# Patient Record
Sex: FEMALE | Race: WHITE | Employment: OTHER | URBAN - METROPOLITAN AREA
[De-identification: names, ages, dates, MRNs, and addresses within clinical notes are randomized per-mention and may not be internally consistent; named-entity substitution may affect disease eponyms.]

---

## 2022-08-19 ENCOUNTER — OFFICE VISIT (OUTPATIENT)
Dept: NEUROLOGY | Age: 22
End: 2022-08-19
Payer: COMMERCIAL

## 2022-08-19 VITALS
OXYGEN SATURATION: 99 % | BODY MASS INDEX: 30.73 KG/M2 | HEART RATE: 86 BPM | SYSTOLIC BLOOD PRESSURE: 110 MMHG | HEIGHT: 62 IN | WEIGHT: 167 LBS | DIASTOLIC BLOOD PRESSURE: 60 MMHG

## 2022-08-19 DIAGNOSIS — H53.9 VISUAL CHANGES: ICD-10-CM

## 2022-08-19 DIAGNOSIS — G43.109 CHRONIC MIGRAINE WITH AURA: Primary | ICD-10-CM

## 2022-08-19 DIAGNOSIS — E23.6 PITUITARY CYST (HCC): ICD-10-CM

## 2022-08-19 PROCEDURE — 99205 OFFICE O/P NEW HI 60 MIN: CPT | Performed by: NURSE PRACTITIONER

## 2022-08-19 RX ORDER — FLUOXETINE 10 MG/1
10 CAPSULE ORAL DAILY
COMMUNITY
Start: 2022-08-01

## 2022-08-19 RX ORDER — BUPROPION HYDROCHLORIDE 150 MG/1
1 TABLET ORAL DAILY
COMMUNITY
Start: 2022-08-01

## 2022-08-19 RX ORDER — PROCHLORPERAZINE MALEATE 10 MG
10 TABLET ORAL
COMMUNITY
Start: 2021-10-13

## 2022-08-19 RX ORDER — NORETHINDRONE ACETATE AND ETHINYL ESTRADIOL 1; .02 MG/1; MG/1
1 TABLET ORAL DAILY
COMMUNITY
Start: 2022-03-29

## 2022-08-19 NOTE — PATIENT INSTRUCTIONS
RESULT POLICY      If we have ordered testing for you, know that; \"NO NEWS IS GOOD NEWS! \"     It is our policy that we no longer call patients with results, nor do we  give test results over the phone. We schedule follow up appointments so that your results can be discussed in person. This allows you to address any questions you have regarding the results. If you choose to go to an imaging center outside of [facility], it is your responsibility to bring imaging report and disc to follow up appointment. If something of concern is revealed on your test, we will contact you to discuss the matter and if needed schedule a sooner follow up appointment. Additionally, results may be found by using the My Chart feature and one of our patient service representatives at the  can give you instructions on how to access this feature to utilize our electronic medical record system. Thank you for your understanding. [address] [facility]   Statement to Patients  April 1, 2014      In an effort to ensure the large volume of patient prescription refills is processed in the most efficient and expeditious manner, we are asking our patients to assist us by calling your Pharmacy for all prescription refills, this will include also your  Mail Order Pharmacy. The pharmacy will contact our office electronically to continue the refill process. Please do not wait until the last minute to call your pharmacy. We need at least 48 hours (2days) to fill prescriptions. We also encourage you to call your pharmacy before going to  your prescription to make sure it is ready. With regard to controlled substance prescription refill requests (narcotic refills) that need to be picked up at our office, we ask your cooperation by providing us with at least 72 hours (3days) notice that you will need a refill.     We will not refill narcotic prescription refill requests after 4:00pm on any weekday, Monday through Thursday, or after 2:00pm on Fridays, or on the weekends. We encourage everyone to explore another way of getting your prescription refill request processed using [email or web address], our patient web portal through our electronic medical record system. [email or web address] is an efficient and effective way to communicate your medication request directly to the office and  downloadable as an bryan on your smart phone . [email or web address] also features a review functionality that allows you to view your medication list as well as leave messages for your physician. Are you ready to get connected? If so please review the attatched instructions or speak to any of our staff to get you set up right away! Thank you so much for your cooperation. Should you have any questions please contact our Practice Administrator.

## 2022-08-21 RX ORDER — ONABOTULINUMTOXINA 200 [USP'U]/1
INJECTION, POWDER, LYOPHILIZED, FOR SOLUTION INTRADERMAL; INTRAMUSCULAR
Qty: 200 UNITS | Refills: 3 | Status: SHIPPED | OUTPATIENT
Start: 2022-08-21

## 2022-08-21 NOTE — PROGRESS NOTES
Sacha Valdez is a 25 y.o. female who presents with the following  Chief Complaint   Patient presents with    New Patient    Migraine       HPI    New patient for migraine   She has had migraines for years and years. She has about 20 days a month as migraine   They last 8 hours or more. The rest are daily headache  She has some type of a headache daily. She has been seeing [facility]. Has tried and failed Topamax, Elavil. 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 Weather is a trigger. No family hx known of migraines or headaches   She has a previous MRI brain with a pituitary cyst.   Not been evaluated in years. She has unilateral, sharp pain on the right   She will get nausea, dizziness, hypersensitivity. She has not been able to find anything to really help rescue  Looking back with notes through [facility] she has also failed imitrex, maxalt, relpax. She does have Nurtec right now with her PCP   She has been debilitated by these migraines for years. Allergies   Allergen Reactions    Erythromycin Nausea and Vomiting    Azithromycin Other (comments)       Current Outpatient Medications   Medication Sig    FLUoxetine (PROzac) 10 mg capsule Take 10 mg by mouth daily. buPROPion XL (WELLBUTRIN XL) 150 mg tablet Take 1 Tablet by mouth daily. norethindrone-ethinyl estradiol (MICROGESTIN 1/20) 1-20 mg-mcg tablet Take 1 Tablet by mouth daily. prochlorperazine (COMPAZINE) 10 mg tablet Take 10 mg by mouth every eight (8) hours as needed. No current facility-administered medications for this visit. Social History     Tobacco Use   Smoking Status Not on file   Smokeless Tobacco Not on file       No past medical history on file.     No past surgical history on file.    No family history on file. Social History     Socioeconomic History    Marital status: SINGLE       Review of Systems   Eyes:  Positive for blurred vision, double vision and photophobia. Respiratory:  Negative for shortness of breath and wheezing. Cardiovascular:  Negative for chest pain. Gastrointestinal:  Positive for nausea. Negative for vomiting. Neurological:  Positive for dizziness and headaches. Negative for seizures and loss of consciousness. Psychiatric/Behavioral:  Negative for memory loss. Remainder of comprehensive review is negative. Physical Exam :    Visit Vitals  /60   Pulse 86   Ht 5' 2\" (1.575 m)   Wt 75.8 kg (167 lb)   SpO2 99%   BMI 30.54 kg/m²       General: Well defined, nourished, and groomed individual in no acute distress. Neck: Supple, nontender, no bruits, no pain with resistance to active range of motion. Musculoskeletal: Extremities revealed no edema and had full range of motion of joints. Psych: Good mood and bright affect    NEUROLOGICAL EXAMINATION:    Mental Status: Alert and oriented to person, place, and time    Cranial Nerves:    II, III, IV, VI: Visual acuity grossly intact. Visual fields are normal.    Pupils are equal, round, and reactive to light and accommodation. Extra-ocular movements are full and fluid. Fundoscopic exam was benign, no ptosis or nystagmus. V-XII: Hearing is grossly intact. Facial features are symmetric, with normal sensation and strength. The palate rises symmetrically and the tongue protrudes midline. Sternocleidomastoids 5/5. Motor Examination: Normal tone, bulk, and strength, 5/5 muscle strength throughout. Coordination: Finger to nose was normal. No resting or intention tremor    Gait and Station: Steady while walking. Normal arm swing. No pronator drift. No muscle wasting or fasiculations noted. Reflexes: DTRs 2+ throughout.           No results found for this or any previous visit. Orders Placed This Encounter    MRI BRAIN W WO CONT     Standing Status:   Future     Standing Expiration Date:   9/19/2023     Order Specific Question:   Is Patient Pregnant? Answer:   No     Order Specific Question:   STAT Creatinine as indicated     Answer:   Yes    REFERRAL TO NEUROLOGY     Referral Priority:   Routine     Referral Type:   Consultation     Referral Reason:   Specialty Services Required     Referred to Provider:   Darron Lujan NP     Number of Visits Requested:   1    FLUoxetine (PROzac) 10 mg capsule     Sig: Take 10 mg by mouth daily. buPROPion XL (WELLBUTRIN XL) 150 mg tablet     Sig: Take 1 Tablet by mouth daily. norethindrone-ethinyl estradiol (MICROGESTIN 1/20) 1-20 mg-mcg tablet     Sig: Take 1 Tablet by mouth daily. prochlorperazine (COMPAZINE) 10 mg tablet     Sig: Take 10 mg by mouth every eight (8) hours as needed. 1. Chronic migraine with aura    2. Pituitary cyst ([address]    3. Visual changes      Refer for Botox for chronic migraine prevention   She is having over 20 migraine days a month   She will benefit significantly. Through [facility] and PCP has failed AED, SSRI and is unable to take BP medications. She will use Nurtec for now PRN   MRI brain to rule out tumor, lesion  Has a pituitary abnormality we will re-evaluate also.                  This note will not be viewable in [employer]

## 2022-09-05 ENCOUNTER — TELEPHONE (OUTPATIENT)
Dept: NEUROLOGY | Age: 22
End: 2022-09-05

## 2022-09-06 ENCOUNTER — HOSPITAL ENCOUNTER (OUTPATIENT)
Dept: MRI IMAGING | Age: 22
Discharge: HOME | End: 2022-09-06
Attending: NURSE PRACTITIONER
Payer: COMMERCIAL

## 2022-09-06 VITALS — BODY MASS INDEX: 29.26 KG/M2 | WEIGHT: 160 LBS

## 2022-09-06 DIAGNOSIS — E23.6 PITUITARY CYST (HCC): ICD-10-CM

## 2022-09-06 DIAGNOSIS — H53.9 VISUAL CHANGES: ICD-10-CM

## 2022-09-06 DIAGNOSIS — G43.109 CHRONIC MIGRAINE WITH AURA: ICD-10-CM

## 2022-09-06 PROCEDURE — A9576 INJ PROHANCE MULTIPACK: HCPCS | Performed by: RADIOLOGY

## 2022-09-06 PROCEDURE — 2709999900 HC NON-CHARGEABLE SUPPLY

## 2022-09-06 PROCEDURE — 74011250636 HC RX REV CODE- 250/636: Performed by: RADIOLOGY

## 2022-09-06 PROCEDURE — 70553 MRI BRAIN STEM W/O & W/DYE: CPT

## 2022-09-06 RX ADMIN — GADOTERIDOL 14 ML: 279.3 INJECTION, SOLUTION INTRAVENOUS at 15:47

## 2022-09-07 ENCOUNTER — TELEPHONE (OUTPATIENT)
Dept: NEUROLOGY | Age: 22
End: 2022-09-07

## 2022-09-07 NOTE — TELEPHONE ENCOUNTER
Re: botox    Rcvd fax with script and message from provider. F[facility]ed [facility] to [facility] with ID and insurance card. Completed PA form for [place] and faxed out for review included cptm [id number] too. Awaiting update.

## 2022-09-17 NOTE — TELEPHONE ENCOUNTER
Re: Botox    Rcvd fax with Approval for botox, effective 9/7/22-9/6/23, auth# [id number]    Scanned letter to chart. Sent update to nurse.

## 2022-09-20 ENCOUNTER — TELEPHONE (OUTPATIENT)
Dept: NEUROLOGY | Age: 22
End: 2022-09-20

## 2022-09-22 NOTE — TELEPHONE ENCOUNTER
Scheduled for next Friday, September 30th at 9:20am. [facility] was unable to make it on Monday afternoon.

## 2022-09-30 ENCOUNTER — OFFICE VISIT (OUTPATIENT)
Dept: NEUROLOGY | Age: 22
End: 2022-09-30
Payer: COMMERCIAL

## 2022-09-30 DIAGNOSIS — G43.109 CHRONIC MIGRAINE WITH AURA: Primary | ICD-10-CM

## 2022-09-30 PROCEDURE — 64615 CHEMODENERV MUSC MIGRAINE: CPT | Performed by: NURSE PRACTITIONER

## 2022-09-30 RX ORDER — CELECOXIB 120 MG/4.8ML
1 LIQUID ORAL
Qty: 5 BOTTLE | Refills: 4 | Status: SHIPPED | OUTPATIENT
Start: 2022-09-30

## 2022-09-30 NOTE — PROGRESS NOTES
[facility]  OFFICE PROCEDURE PROGRESS NOTE        Chart reviewed for the following:   I, [de-identified] M ASH Vasquez, have reviewed the History, Physical and updated the Allergic reactions for [address] performed immediately prior to start of procedure:   I, [de-identified] M ASH Vasquez, have performed the following reviews on Jennifer Nurse prior to the start of the procedure:            * Patient was identified by name and date of birth   * Agreement on procedure being performed was verified  * Risks and Benefits explained to the patient  * Procedure site verified and marked as necessary  * Patient was positioned for comfort  * Consent was signed and verified     Time: 1000      Date of procedure: 9/30/2022    Procedure performed by:  Mazin Aguayo NP    Provider assisted by: None    Patient assisted by: None    How tolerated by patient: tolerated the procedure well with no complications    Post Procedural Pain Scale: 2 - Hurts Little Bit    Comments: None    Botox Injection Note       Indication: patient has chronic recurrent migraine, has greater than 15 migraine headaches per month, has failed two or more categories of preventatives    Procedure:   Botox concentration: 200 units in 4 ml of preservative-free normal saline. 31 sites injections, distribution as follow      Units/site  Sites Sides Subtotal    Procerus 5 1 1 5    5 1 2 10   Frontalis 5 2 2 20   Temporalis 5 4 2 40   Occipitalis 5 3 2 30   Upper cervical paraspinalis 5 2 2 20   Trapezius 5 3 2 30         200 units Botox were reconstituted, 155 units injected as above and the remainder was unavoidably wasted.      Patient tolerated procedure well.     ________________________  Gala Rodriguez

## 2022-09-30 NOTE — PROGRESS NOTES
Chief Complaint   Patient presents with    Procedure     Patient is here for botox. Patient states she is having 3 headaches a week and 1 migraine a week .

## 2022-12-05 ENCOUNTER — TELEPHONE (OUTPATIENT)
Dept: NEUROLOGY | Age: 22
End: 2022-12-05

## 2022-12-05 NOTE — TELEPHONE ENCOUNTER
Patient would like to speak with Gianna Resendiz regarding a migraine she has been having over the past week it comes and goes it throbs and then it eases up. Patient has an appointment tomorrow 12-6-22 and she has to drive an hour and a half and it would like to change it to VV if possible?       Please contact

## 2022-12-06 ENCOUNTER — VIRTUAL VISIT (OUTPATIENT)
Dept: NEUROLOGY | Age: 22
End: 2022-12-06
Payer: COMMERCIAL

## 2022-12-06 DIAGNOSIS — G43.109 CHRONIC MIGRAINE WITH AURA: Primary | ICD-10-CM

## 2022-12-06 PROCEDURE — 99214 OFFICE O/P EST MOD 30 MIN: CPT | Performed by: NURSE PRACTITIONER

## 2022-12-06 RX ORDER — ONABOTULINUMTOXINA 200 [USP'U]/1
INJECTION, POWDER, LYOPHILIZED, FOR SOLUTION INTRADERMAL; INTRAMUSCULAR
Qty: 200 UNITS | Refills: 3 | Status: SHIPPED | OUTPATIENT
Start: 2022-12-06

## 2022-12-06 RX ORDER — PREDNISONE 10 MG/1
TABLET ORAL
Qty: 42 TABLET | Refills: 0 | Status: SHIPPED | OUTPATIENT
Start: 2022-12-06

## 2022-12-06 RX ORDER — ATOGEPANT 60 MG/1
1 TABLET ORAL DAILY
Qty: 90 TABLET | Refills: 1 | Status: SHIPPED | OUTPATIENT
Start: 2022-12-06

## 2022-12-06 RX ORDER — RIMEGEPANT SULFATE 75 MG/75MG
1 TABLET, ORALLY DISINTEGRATING ORAL
COMMUNITY
Start: 2022-04-01 | End: 2022-12-06 | Stop reason: ALTCHOICE

## 2022-12-06 NOTE — PROGRESS NOTES
Since botox has not seen little diffence has had about 2-3 migraines  Having daily HA  Ubrelvy seems to work well. Has had a migraines the past week the the [place] doesn't work for this.    Is sick currently

## 2022-12-07 NOTE — PROGRESS NOTES
Nancy Vo is a 25 y.o. female who was seen by synchronous (real-time) audio-video technology on 12/6/2022 for Follow-up (Post botox migraines)    Virtual FU for chronic migraine post Botox # 1     She is doing ok today   She currently thinks she has a virus and this is making things worse   She is to see PCP   Post BOTOX she had some drooping on her eyelid which is gone. She does feel like it helps some but is definitely interested in getting the second 1 if we think it would help  She is having headaches still we can look at getting a prednisone to help with breaking up the cycle and her virus right now  She is still being active as she can  They have not changed in any other way they are just definitely still there      Assessment & Plan:   Diagnoses and all orders for this visit:    1. Chronic migraine with aura    Other orders  -     predniSONE (DELTASONE) 10 mg tablet; 6 po x2 days, 5 po x 2days, 4 po x 2days, 3 po x2days, 2 po x2days, 1 po x 2days  -     atogepant (Qulipta) 60 mg tab; Take 1 Tablet by mouth daily. -     onabotulinumtoxinA (Botox) 200 unit injection; Inject 155 units into 31 FDA approved sites in head, face, neck, every 3 months for chronic migraine. Continue Botox for treatment #2 as with the clinical trials to needs to be done to evaluate for effectiveness  We will avoid doing Botox above the eyelids as this did cause what she states a significant droop  Continue as needed medications    Get in with PCP today to look into virus  Prednisone to help with breaking cycle  [facility]. FU for botox next. Subjective:       Prior to Admission medications    Medication Sig Start Date End Date Taking? Authorizing Provider   predniSONE (DELTASONE) 10 mg tablet 6 po x2 days, 5 po x 2days, 4 po x 2days, 3 po x2days, 2 po x2days, 1 po x 2days 12/6/22  Yes O'Laughlin, Steward Heimlich, NP   atogepant (Qulipta) 60 mg tab Take 1 Tablet by mouth daily.  12/6/22  Yes O'Kwaku, Lonne Reap, NP   onabotulinumtoxinA (Botox) 200 unit injection Inject 155 units into 31 FDA approved sites in head, face, neck, every 3 months for chronic migraine. 12/6/22  Yes Brandon Vasquez NP   [place] Sidonie Fell) 100 mg tablet 1 at HA onset and repeat in 2 hours if needed. Max 2 in 24 hours 9/30/22  Yes Brandon Vasquez NP   buPROPion XL (WELLBUTRIN XL) 150 mg tablet Take 1 Tablet by mouth daily. 8/1/22  Yes Provider, Historical   norethindrone-ethinyl estradiol (MICROGESTIN 1/20) 1-20 mg-mcg tablet Take 1 Tablet by mouth daily. 3/29/22  Yes Provider, Historical   prochlorperazine (COMPAZINE) 10 mg tablet Take 10 mg by mouth every eight (8) hours as needed. 10/13/21  Yes Provider, Historical     There is no problem list on file for this patient. There are no problems to display for this patient. Current Outpatient Medications   Medication Sig Dispense Refill    predniSONE (DELTASONE) 10 mg tablet 6 po x2 days, 5 po x 2days, 4 po x 2days, 3 po x2days, 2 po x2days, 1 po x 2days 42 Tablet 0    atogepant (Qulipta) 60 mg tab Take 1 Tablet by mouth daily. 90 Tablet 1    onabotulinumtoxinA (Botox) 200 unit injection Inject 155 units into 31 FDA approved sites in head, face, neck, every 3 months for chronic migraine. 200 Units 3    ubrogepant (Ubrelvy) 100 mg tablet 1 at HA onset and repeat in 2 hours if needed. Max 2 in 24 hours 16 Tablet 5    buPROPion XL (WELLBUTRIN XL) 150 mg tablet Take 1 Tablet by mouth daily. norethindrone-ethinyl estradiol (MICROGESTIN 1/20) 1-20 mg-mcg tablet Take 1 Tablet by mouth daily. prochlorperazine (COMPAZINE) 10 mg tablet Take 10 mg by mouth every eight (8) hours as needed. Allergies   Allergen Reactions    Erythromycin Nausea and Vomiting    Azithromycin Other (comments)     No past medical history on file. No past surgical history on file. No family history on file.   Social History     Tobacco Use    Smoking status: Not on file    Smokeless tobacco: Not on file   Substance Use Topics    Alcohol use: Not on file       Review of Systems   Eyes:  Positive for blurred vision and photophobia. Negative for double vision. Cardiovascular:  Negative for chest pain, palpitations, leg swelling and PND. Gastrointestinal:  Positive for nausea. Negative for vomiting. Neurological:  Positive for dizziness and headaches. Negative for seizures and loss of consciousness. Objective:   No flowsheet data found.      [INSTRUCTIONS:  \"[x]\" Indicates a positive item  \"[]\" Indicates a negative item  -- DELETE ALL ITEMS NOT EXAMINED]    Constitutional: [x] Appears well-developed and well-nourished [x] No apparent distress      [] Abnormal -     Mental status: [x] Alert and awake  [x] Oriented to person/place/time [x] Able to follow commands    [] Abnormal -     Eyes:   EOM    [x]  Normal    [] Abnormal -   Sclera  [x]  Normal    [] Abnormal -          Discharge [x]  None visible   [] Abnormal -     HENT: [x] Normocephalic, atraumatic  [] Abnormal -   [x] Mouth/Throat: Mucous membranes are moist    External Ears [x] Normal  [] Abnormal -    Neck: [x] No visualized mass [] Abnormal -     Pulmonary/Chest: [x] Respiratory effort normal   [x] No visualized signs of difficulty breathing or respiratory distress        [] Abnormal -      Musculoskeletal:   [x] Normal gait with no signs of ataxia         [x] Normal range of motion of neck        [] Abnormal -     Neurological:        [x] No Facial Asymmetry (Cranial nerve 7 motor function) (limited exam due to video visit)          [x] No gaze palsy        [] Abnormal -          Skin:        [x] No significant exanthematous lesions or discoloration noted on facial skin         [] Abnormal -            Psychiatric:       [x] Normal Affect [] Abnormal -        [x] No Hallucinations    Other pertinent observable physical exam findings:-        We discussed the expected course, resolution and complications of the diagnosis(es) in detail. Medication risks, benefits, costs, interactions, and alternatives were discussed as indicated. I advised her to contact the office if her condition worsens, changes or fails to improve as anticipated. She expressed understanding with the diagnosis(es) and plan. Shemar Marshall, was evaluated through a synchronous (real-time) audio-video encounter. The patient (or guardian if applicable) is aware that this is a billable service, which includes applicable co-pays. This Virtual Visit was conducted with patient's (and/or legal guardian's) consent. The visit was conducted pursuant to the emergency declaration under the [address] [place] [address], [address] [place] [address] authority and the [employer] and [email or web address] General Act. Patient identification was verified, and a caregiver was present when appropriate. The patient was located at: Home: Nancy [facility] [address]  The provider was located at:  [facility]ility [facility] [address] [place] [address] [address] [place], [place] [address]

## 2022-12-19 ENCOUNTER — TELEPHONE (OUTPATIENT)
Dept: NEUROLOGY | Age: 22
End: 2022-12-19

## 2022-12-27 ENCOUNTER — TELEPHONE (OUTPATIENT)
Dept: NEUROLOGY | Age: 22
End: 2022-12-27

## 2022-12-27 NOTE — TELEPHONE ENCOUNTER
Pt calling ot see if there is any availability to reschedule her procedure for botox a week or two due to conflicts with work, please call.

## 2023-01-03 ENCOUNTER — PATIENT MESSAGE (OUTPATIENT)
Dept: NEUROLOGY | Age: 23
End: 2023-01-03

## 2023-01-16 ENCOUNTER — TELEPHONE (OUTPATIENT)
Dept: NEUROLOGY | Age: 23
End: 2023-01-16

## 2023-01-20 ENCOUNTER — OFFICE VISIT (OUTPATIENT)
Dept: NEUROLOGY | Age: 23
End: 2023-01-20
Payer: COMMERCIAL

## 2023-01-20 DIAGNOSIS — G43.109 CHRONIC MIGRAINE WITH AURA: Primary | ICD-10-CM

## 2023-01-20 NOTE — PROGRESS NOTES
[facility]  OFFICE PROCEDURE PROGRESS NOTE        Chart reviewed for the following:   I, [de-identified] M ASH Vasquze, have reviewed the History, Physical and updated the Allergic reactions for [address] performed immediately prior to start of procedure:   I, [de-identified] M ASH Vasquez, have performed the following reviews on Matthew Elena prior to the start of the procedure:            * Patient was identified by name and date of birth   * Agreement on procedure being performed was verified  * Risks and Benefits explained to the patient  * Procedure site verified and marked as necessary  * Patient was positioned for comfort  * Consent was signed and verified     Time: 1159      Date of procedure: 1/20/2023    Procedure performed by:  Arthur Herrera NP    Provider assisted by: None    Patient assisted by: None    How tolerated by patient: tolerated the procedure well with no complications    Post Procedural Pain Scale: 2 - Hurts Little Bit    Comments: None      Botox Injection Note       Indication: patient has chronic recurrent migraine, has 7-10 less migraine days per month with botox injections    Procedure:   Botox concentration: 200 units in 4 ml of preservative-free normal saline. 31 sites injections, distribution as follow      Units/site  Sites Sides Subtotal    Procerus 5 1 1 5    5 1 2 10   Frontalis 5 2 2 20   Temporalis 5 4 2 40   Occipitalis 5 3 2 30   Upper cervical paraspinalis 5 2 2 20   Trapezius 5 3 2 30         200 units Botox were reconstituted, 155 units injected as above and the remainder was unavoidably wasted.      Patient tolerated procedure well.     _____________________________   Ronaldo [place]

## 2023-01-23 DIAGNOSIS — G43.909 MIGRAINE WITHOUT STATUS MIGRAINOSUS, NOT INTRACTABLE, UNSPECIFIED MIGRAINE TYPE: Primary | ICD-10-CM

## 2023-01-23 RX ORDER — BUTALBITAL, ACETAMINOPHEN AND CAFFEINE 50; 325; 40 MG/1; MG/1; MG/1
TABLET ORAL
Qty: 30 TABLET | Refills: 4 | Status: SHIPPED | OUTPATIENT
Start: 2023-01-23

## 2023-01-24 RX ORDER — DICLOFENAC SODIUM 75 MG/1
75 TABLET, DELAYED RELEASE ORAL 2 TIMES DAILY
Qty: 10 TABLET | Refills: 1 | Status: SHIPPED | OUTPATIENT
Start: 2023-01-24

## 2023-01-27 ENCOUNTER — OFFICE VISIT (OUTPATIENT)
Dept: NEUROLOGY | Age: 23
End: 2023-01-27
Payer: COMMERCIAL

## 2023-01-27 DIAGNOSIS — G43.109 CHRONIC MIGRAINE WITH AURA: Primary | ICD-10-CM

## 2023-01-27 PROCEDURE — 96372 THER/PROPH/DIAG INJ SC/IM: CPT | Performed by: NURSE PRACTITIONER

## 2023-01-27 RX ADMIN — KETOROLAC TROMETHAMINE 60 MG: 30 INJECTION, SOLUTION INTRAMUSCULAR; INTRAVENOUS at 16:00

## 2023-01-27 RX ADMIN — DEXAMETHASONE SODIUM PHOSPHATE 8 MG: 4 INJECTION, SOLUTION INTRA-ARTICULAR; INTRALESIONAL; INTRAMUSCULAR; INTRAVENOUS; SOFT TISSUE at 16:00

## 2023-01-30 RX ORDER — DEXAMETHASONE SODIUM PHOSPHATE 4 MG/ML
8 INJECTION, SOLUTION INTRA-ARTICULAR; INTRALESIONAL; INTRAMUSCULAR; INTRAVENOUS; SOFT TISSUE ONCE
Status: COMPLETED | OUTPATIENT
Start: 2023-01-30 | End: 2023-01-27

## 2023-01-30 RX ORDER — KETOROLAC TROMETHAMINE 30 MG/ML
60 INJECTION, SOLUTION INTRAMUSCULAR; INTRAVENOUS ONCE
Status: COMPLETED | OUTPATIENT
Start: 2023-01-30 | End: 2023-01-27

## 2023-03-17 ENCOUNTER — VIRTUAL VISIT (OUTPATIENT)
Dept: NEUROLOGY | Age: 23
End: 2023-03-17

## 2023-03-17 DIAGNOSIS — G43.109 CHRONIC MIGRAINE WITH AURA: Primary | ICD-10-CM

## 2023-03-17 RX ORDER — ATOGEPANT 60 MG/1
1 TABLET ORAL DAILY
Qty: 90 TABLET | Refills: 1 | Status: SHIPPED | OUTPATIENT
Start: 2023-03-17

## 2023-03-17 RX ORDER — PROCHLORPERAZINE MALEATE 10 MG
TABLET ORAL
Qty: 30 TABLET | Refills: 3 | Status: SHIPPED | OUTPATIENT
Start: 2023-03-17

## 2023-03-19 NOTE — PROGRESS NOTES
Michael Cortés is a 21 y.o. female who was seen by synchronous (real-time) audio-video technology on 3/17/2023 for Follow-up    Follow-up for chronic migraine  She was getting Botox  She has had 2 Botox treatments in has seen some improvement here with the second 1  She is down from a chronic migraine with 30 days a month to about 8  She does state that things are going a little bit better with work and stress  She does also notice that she feels like the Botox with this round has significantly changed  She has not had a really bad 1 where she has had to take any kind of rescue  She does want to refill the Compazine  She has Tania Jenkins if she needs it but actually since the second dose of Botox she has not needed it  She is on Vivica Champagne for a preventative also but the Botox is the big positive here  She is seen a significant difference in just overall prevention  She is not noticed any debilitating migraines in which she had had to use anything    With the last Botox injection we did give her some Toradol and Decadron post migraine and for we will do this again    Assessment & Plan:   Diagnoses and all orders for this visit:    1. Chronic migraine with aura    Other orders  -     atogepant (Qulipta) 60 mg tab; Take 1 Tablet by mouth daily. -     prochlorperazine (COMPAZINE) 10 mg tablet; 1 tablet by mouth every 8 hours PRN      Continue Botox for chronic migraine management  We discussed this in full  She is down from 30 a month to about 8  Then on is painful and not as debilitating  She has not really used the Ubrelvy at all which is a good thing  She will continue to use this if she does need it but continue to use Compazine as needed as we will fill this and Qulipta 60 mg for right now episodic migraine  She will not change anything and will follow back up in about a month for her next Botox    Subjective:       Prior to Admission medications    Medication Sig Start Date End Date Taking?  Authorizing Provider atogepant (Qulipta) 60 mg tab Take 1 Tablet by mouth daily. 3/17/23  Yes Heriberto Vasquez NP   prochlorperazine (COMPAZINE) 10 mg tablet 1 tablet by mouth every 8 hours PRN 3/17/23  Yes Heriberto Vasquez NP   onabotulinumtoxinA (Botox) 200 unit injection Inject 155 units into 31 FDA approved sites in head, face, neck, every 3 months for chronic migraine. 12/6/22  Yes Heriberto Vasquez NP   Guadlupe Plan Lawernce Bean) 100 mg tablet 1 at HA onset and repeat in 2 hours if needed. Max 2 in 24 hours 9/30/22  Yes Heriberto Vasquez NP   buPROPion XL (WELLBUTRIN XL) 150 mg tablet Take 1 Tablet by mouth daily. 8/1/22  Yes Provider, Historical   norethindrone-ethinyl estradiol (MICROGESTIN 1/20) 1-20 mg-mcg tablet Take 1 Tablet by mouth daily. 3/29/22  Yes Provider, Historical     There is no problem list on file for this patient. There are no problems to display for this patient. Current Outpatient Medications   Medication Sig Dispense Refill    atogepant (Qulipta) 60 mg tab Take 1 Tablet by mouth daily. 90 Tablet 1    prochlorperazine (COMPAZINE) 10 mg tablet 1 tablet by mouth every 8 hours PRN 30 Tablet 3    onabotulinumtoxinA (Botox) 200 unit injection Inject 155 units into 31 FDA approved sites in head, face, neck, every 3 months for chronic migraine. 200 Units 3    ubrogepant (Ubrelvy) 100 mg tablet 1 at HA onset and repeat in 2 hours if needed. Max 2 in 24 hours 16 Tablet 5    buPROPion XL (WELLBUTRIN XL) 150 mg tablet Take 1 Tablet by mouth daily. norethindrone-ethinyl estradiol (MICROGESTIN 1/20) 1-20 mg-mcg tablet Take 1 Tablet by mouth daily. Allergies   Allergen Reactions    Erythromycin Nausea and Vomiting    Azithromycin Other (comments)     Past Medical History:   Diagnosis Date    Migraine      History reviewed. No pertinent surgical history. History reviewed. No pertinent family history.   Social History     Tobacco Use    Smoking status: Never    Smokeless tobacco: Not on file Substance Use Topics    Alcohol use: Yes     Comment: Very rarely       ROS    Objective:   No flowsheet data found. [INSTRUCTIONS:  \"[x]\" Indicates a positive item  \"[]\" Indicates a negative item  -- DELETE ALL ITEMS NOT EXAMINED]    Constitutional: [x] Appears well-developed and well-nourished [x] No apparent distress      [] Abnormal -     Mental status: [x] Alert and awake  [x] Oriented to person/place/time [x] Able to follow commands    [] Abnormal -     Eyes:   EOM    [x]  Normal    [] Abnormal -   Sclera  [x]  Normal    [] Abnormal -          Discharge [x]  None visible   [] Abnormal -     HENT: [x] Normocephalic, atraumatic  [] Abnormal -   [x] Mouth/Throat: Mucous membranes are moist    External Ears [x] Normal  [] Abnormal -    Neck: [x] No visualized mass [] Abnormal -     Pulmonary/Chest: [x] Respiratory effort normal   [x] No visualized signs of difficulty breathing or respiratory distress        [] Abnormal -      Musculoskeletal:   [x] Normal gait with no signs of ataxia         [x] Normal range of motion of neck        [] Abnormal -     Neurological:        [x] No Facial Asymmetry (Cranial nerve 7 motor function) (limited exam due to video visit)          [x] No gaze palsy        [] Abnormal -          Skin:        [x] No significant exanthematous lesions or discoloration noted on facial skin         [] Abnormal -            Psychiatric:       [x] Normal Affect [] Abnormal -        [x] No Hallucinations    Other pertinent observable physical exam findings:-        We discussed the expected course, resolution and complications of the diagnosis(es) in detail. Medication risks, benefits, costs, interactions, and alternatives were discussed as indicated. I advised her to contact the office if her condition worsens, changes or fails to improve as anticipated. She expressed understanding with the diagnosis(es) and plan.        Kathie Garcia, was evaluated through a synchronous (real-time) audio-video encounter. The patient (or guardian if applicable) is aware that this is a billable service, which includes applicable co-pays. This Virtual Visit was conducted with patient's (and/or legal guardian's) consent. The visit was conducted pursuant to the emergency declaration under the [address] [place] [address] authority and the [employer] and [employer] Act. Patient identification was verified, and a caregiver was present when appropriate. The patient was located at: Home: Nancy Ko [facility] [address]  The provider was located at:  Facility (Appt Department): [address] [place] [address],

## 2023-04-18 ENCOUNTER — TELEPHONE (OUTPATIENT)
Dept: NEUROLOGY | Age: 23
End: 2023-04-18

## 2023-04-21 ENCOUNTER — OFFICE VISIT (OUTPATIENT)
Dept: NEUROLOGY | Age: 23
End: 2023-04-21

## 2023-04-21 DIAGNOSIS — G43.109 CHRONIC MIGRAINE WITH AURA: Primary | ICD-10-CM

## 2023-04-21 RX ORDER — KETOROLAC TROMETHAMINE 30 MG/ML
60 INJECTION, SOLUTION INTRAMUSCULAR; INTRAVENOUS ONCE
Status: COMPLETED | OUTPATIENT
Start: 2023-04-21 | End: 2023-04-21

## 2023-04-21 RX ORDER — DEXAMETHASONE SODIUM PHOSPHATE 4 MG/ML
8 INJECTION, SOLUTION INTRA-ARTICULAR; INTRALESIONAL; INTRAMUSCULAR; INTRAVENOUS; SOFT TISSUE ONCE
Status: COMPLETED | OUTPATIENT
Start: 2023-04-21 | End: 2023-04-21

## 2023-04-21 RX ADMIN — KETOROLAC TROMETHAMINE 60 MG: 30 INJECTION, SOLUTION INTRAMUSCULAR; INTRAVENOUS at 14:51

## 2023-04-21 RX ADMIN — DEXAMETHASONE SODIUM PHOSPHATE 8 MG: 4 INJECTION, SOLUTION INTRA-ARTICULAR; INTRALESIONAL; INTRAMUSCULAR; INTRAVENOUS; SOFT TISSUE at 14:51

## 2023-04-21 NOTE — PROGRESS NOTES
[facility] NEUROLOGY CLINIC  OFFICE PROCEDURE PROGRESS NOTE        Chart reviewed for the following:   I, [de-identified] M ASH Vasquez, have reviewed the History, Physical and updated the Allergic reactions for [address] performed immediately prior to start of procedure:   I, [de-identified] M ASH Vasquez, have performed the following reviews on Nan Givens prior to the start of the procedure:            * Patient was identified by name and date of birth   * Agreement on procedure being performed was verified  * Risks and Benefits explained to the patient  * Procedure site verified and marked as necessary  * Patient was positioned for comfort  * Consent was signed and verified     Time: 1230      Date of procedure: 4/21/2023    Procedure performed by:  Ophelia Kay NP    Provider assisted by: None    Patient assisted by: None    How tolerated by patient: tolerated the procedure well with no complications    Post Procedural Pain Scale: 2 - Hurts Little Bit    Comments: None    [facility] [id number]  EXP: 10/2025  Botox Injection Note       Indication: patient has chronic recurrent migraine, has 7-10 less migraine days per month with botox injections    Procedure:   Botox concentration: 200 units in 4 ml of preservative-free normal saline. 31 sites injections, distribution as follow      Units/site  Sites Sides Subtotal    Procerus 5 1 1 5    5 1 2 10   Frontalis 5 2 2 20   Temporalis 5 4 2 40   Occipitalis 5 3 2 30   Upper cervical paraspinalis 5 2 2 20   Trapezius 5 3 2 30         200 units Botox were reconstituted, 155 units injected as above and the remainder was unavoidably wasted. Patient tolerated procedure well.     _____________________________   Awanda Raleigh OLAUGHLIN        60 mg IM Toradol   8 mg IM Decadron given in office   No side effects.

## 2023-05-18 ENCOUNTER — TELEPHONE (OUTPATIENT)
Age: 23
End: 2023-05-18

## 2023-05-18 NOTE — TELEPHONE ENCOUNTER
Patient called her insurance and the previous PA has been denied because she had not taken Nurtec. She stated has taken Nurtec (Samples per Quan Dross given). She wants you to call her insurance company and let them know she has taken Nurtec and it did not work for her. Please call if need to discuss further.

## 2023-05-18 NOTE — TELEPHONE ENCOUNTER
Re: Jairo Berkowitz call from local [facility] specialty for PA, looks like pt called as well, created case in [facility] key# [id number], submitted and awaiting update, per review Tabitha Moss is non-preferred.

## 2023-05-18 NOTE — TELEPHONE ENCOUNTER
Re: Kevyn Germain    Reviewed PA request, per review pt was given samples of Nurtec per Key# [id number]    I reviewed script in chart and there is no quantity or dosing schedule listed. Per clinical questons pt MUST have a trial of Nurtec before Kevyn Dallas can be approved. \"*Use of samples to initiate therapy does not meet step-edit/preauthorization criteria. *    *Previous therapies will be verified through pharmacy paid claims or submitted chart notes. *\"    Sent update to provider

## 2023-05-24 ENCOUNTER — TELEPHONE (OUTPATIENT)
Age: 23
End: 2023-05-24

## 2023-05-25 ENCOUNTER — TELEPHONE (OUTPATIENT)
Age: 23
End: 2023-05-25

## 2023-05-30 ENCOUNTER — TELEPHONE (OUTPATIENT)
Age: 23
End: 2023-05-30

## 2023-05-30 DIAGNOSIS — G43.109 MIGRAINE WITH AURA, NOT INTRACTABLE, WITHOUT STATUS MIGRAINOSUS: Primary | ICD-10-CM

## 2023-05-30 RX ORDER — RIMEGEPANT SULFATE 75 MG/75MG
TABLET, ORALLY DISINTEGRATING ORAL
Qty: 16 TABLET | Refills: 4 | Status: SHIPPED | OUTPATIENT
Start: 2023-05-30

## 2023-05-30 RX ORDER — ATOGEPANT 60 MG/1
1 TABLET ORAL DAILY
Qty: 90 TABLET | Refills: 1 | Status: SHIPPED | OUTPATIENT
Start: 2023-05-30

## 2023-05-30 NOTE — TELEPHONE ENCOUNTER
Perez Rubi- (Paredes: [id number])- DENIED     Reason for Denial:   The plan covers the prescribed medication for members who have had an unsuccessful 2-month trial of at least two preventive classes of migraine medications: anticonvulsants, beta-blockers, antidepressants, or long-acting CGRP inhitors.      Provided office note from 8/19/22 showing patient has tried and failed Topamax and Elavil via fax # [phone]    Reconsideration has been received and is in review    [facility] # [phone]    Option 3

## 2023-06-01 NOTE — TELEPHONE ENCOUNTER
06/01/2023 *UPDATED*  Abdulkadir Vleazco (Reconsideration) DENIED [phone] option 3    Contacted patient who stated she called insurance herself and was told denied due to no evidence of Nurtec trial. Patient claims she tried nurtec but made headaches worse and stopped using. I called insurance and advise patient shows nurtec rx from 04/22 to 12/22 in file, however denial letter specifically stated it wants a trail and fail of beta-blockers; anticonvulsants; antidepressants; or Emgality/Aimovig which was provided (note dated 08/2022) and a detailed letter. Rep stated a Peer to Peer can be completed at this time to overturn decision.

## 2023-06-02 ENCOUNTER — TELEPHONE (OUTPATIENT)
Age: 23
End: 2023-06-02

## 2023-06-05 NOTE — TELEPHONE ENCOUNTER
Message addressed. Pt came in picked up samples. Peer to Peer done on Friday 6/2. Questions answered.

## 2023-07-05 ENCOUNTER — TELEPHONE (OUTPATIENT)
Age: 23
End: 2023-07-05

## 2023-07-06 ENCOUNTER — TELEPHONE (OUTPATIENT)
Age: 23
End: 2023-07-06

## 2023-07-11 ENCOUNTER — TELEPHONE (OUTPATIENT)
Age: 23
End: 2023-07-11

## 2023-07-11 NOTE — TELEPHONE ENCOUNTER
Patient is calling to see if we have an earlier or a later procedure time for 7/14/23. Stated she had already seen the message that was sent to her. Wanted us to send one more message to see if anything could be done to change it since her partner brings her to the appointments due to her getting a bit sick for the procedure. But stated they live an hour and a half away and partner has an appt at 6. Please contact to discuss.

## 2023-07-14 ENCOUNTER — PROCEDURE VISIT (OUTPATIENT)
Age: 23
End: 2023-07-14

## 2023-07-14 DIAGNOSIS — G43.711 CHRONIC MIGRAINE WITHOUT AURA, INTRACTABLE, WITH STATUS MIGRAINOSUS: Primary | ICD-10-CM

## 2023-07-14 RX ORDER — ONDANSETRON 2 MG/ML
4 INJECTION INTRAMUSCULAR; INTRAVENOUS ONCE
Status: COMPLETED | OUTPATIENT
Start: 2023-07-14 | End: 2023-07-14

## 2023-07-14 RX ORDER — KETOROLAC TROMETHAMINE 30 MG/ML
60 INJECTION, SOLUTION INTRAMUSCULAR; INTRAVENOUS EVERY 6 HOURS PRN
Status: SHIPPED | OUTPATIENT
Start: 2023-07-14 | End: 2023-07-19

## 2023-07-14 RX ORDER — DEXAMETHASONE SODIUM PHOSPHATE 4 MG/ML
4 INJECTION, SOLUTION INTRA-ARTICULAR; INTRALESIONAL; INTRAMUSCULAR; INTRAVENOUS; SOFT TISSUE EVERY 6 HOURS
Status: SHIPPED | OUTPATIENT
Start: 2023-07-14

## 2023-07-14 RX ADMIN — KETOROLAC TROMETHAMINE 60 MG: 30 INJECTION, SOLUTION INTRAMUSCULAR; INTRAVENOUS at 12:48

## 2023-07-14 RX ADMIN — ONDANSETRON 4 MG: 2 INJECTION INTRAMUSCULAR; INTRAVENOUS at 12:47

## 2023-07-14 RX ADMIN — DEXAMETHASONE SODIUM PHOSPHATE 4 MG: 4 INJECTION, SOLUTION INTRA-ARTICULAR; INTRALESIONAL; INTRAMUSCULAR; INTRAVENOUS; SOFT TISSUE at 12:47

## 2023-07-14 NOTE — PROGRESS NOTES
OFFICE PROCEDURE PROGRESS NOTE        Chart reviewed for the following:   Mathew CHRIS APRN - NP, have reviewed the History, Physical and updated the Allergic reactions for [address] [place] [address] Drive performed immediately prior to start of procedure:   Yesi CHRIS APRN - NP, have performed the following reviews on Radha Lowry prior to the start of the procedure:            * Patient was identified by name and date of birth   * Agreement on procedure being performed was verified  * Risks and Benefits explained to the patient  * Procedure site verified and marked as necessary  * Patient was positioned for comfort  * Consent was signed and verified     Time: 1200      Date of procedure: 7/14/2023    Procedure performed by:  LYNDA Saavedra NP    Provider assisted by: None    Patient assisted by: None    How tolerated by patient: tolerated the procedure well with no complications    Post Procedural Pain Scale: 2 - Hurts Little Bit    Comments: None    ZSK:[id number]  EXP:  02/2026          Botox Injection Note       Indication: patient has chronic recurrent migraine, has 7-10 less migraine days per month with botox injections    Procedure:   Botox concentration: 200 units in 4 ml of preservative-free normal saline. 31 sites injections, distribution as follow      Units/site  Sites Sides Subtotal    Procerus 5 1 1 5    5 1 2 10   Frontalis 5 2 2 20   Temporalis 5 4 2 40   Occipitalis 5 3 2 30   Upper cervical paraspinalis 5 2 2 20   Trapezius 5 3 2 30         200 units Botox were reconstituted, 155 units injected as above and the remainder was unavoidably wasted. Patient tolerated procedure well.     _____________________________   Fanny Lomax OLAUGHLIN       60 mg IM toradol   4 mg IM Decadron   4 mg IM Zofran given with no side effects.

## 2023-10-06 ENCOUNTER — PROCEDURE VISIT (OUTPATIENT)
Age: 23
End: 2023-10-06

## 2023-10-06 DIAGNOSIS — G43.711 CHRONIC MIGRAINE WITHOUT AURA, INTRACTABLE, WITH STATUS MIGRAINOSUS: Primary | ICD-10-CM

## 2023-10-06 RX ORDER — DEXAMETHASONE SODIUM PHOSPHATE 4 MG/ML
4 INJECTION, SOLUTION INTRA-ARTICULAR; INTRALESIONAL; INTRAMUSCULAR; INTRAVENOUS; SOFT TISSUE ONCE
Status: COMPLETED | OUTPATIENT
Start: 2023-10-06 | End: 2023-10-06

## 2023-10-06 RX ORDER — DICLOFENAC SODIUM 75 MG/1
75 TABLET, DELAYED RELEASE ORAL 2 TIMES DAILY
Qty: 60 TABLET | Refills: 3 | Status: SHIPPED | OUTPATIENT
Start: 2023-10-06

## 2023-10-06 RX ORDER — KETOROLAC TROMETHAMINE 30 MG/ML
60 INJECTION, SOLUTION INTRAMUSCULAR; INTRAVENOUS ONCE
Status: COMPLETED | OUTPATIENT
Start: 2023-10-06 | End: 2023-10-06

## 2023-10-06 RX ORDER — ONDANSETRON 2 MG/ML
4 INJECTION INTRAMUSCULAR; INTRAVENOUS ONCE
Status: COMPLETED | OUTPATIENT
Start: 2023-10-06 | End: 2023-10-06

## 2023-10-06 RX ORDER — ATOGEPANT 60 MG/1
1 TABLET ORAL DAILY
Qty: 90 TABLET | Refills: 1 | Status: SHIPPED | OUTPATIENT
Start: 2023-10-06

## 2023-10-06 RX ADMIN — DEXAMETHASONE SODIUM PHOSPHATE 4 MG: 4 INJECTION, SOLUTION INTRA-ARTICULAR; INTRALESIONAL; INTRAMUSCULAR; INTRAVENOUS; SOFT TISSUE at 14:50

## 2023-10-06 RX ADMIN — KETOROLAC TROMETHAMINE 60 MG: 30 INJECTION, SOLUTION INTRAMUSCULAR; INTRAVENOUS at 14:50

## 2023-10-06 RX ADMIN — ONDANSETRON 4 MG: 2 INJECTION INTRAMUSCULAR; INTRAVENOUS at 14:51

## 2023-10-06 NOTE — PROGRESS NOTES
OFFICE PROCEDURE PROGRESS NOTE        Chart reviewed for the following:   Mathew CHRIS APRN - NP, have reviewed the History, Physical and updated the Allergic reactions for [address] [place] [address] Drive performed immediately prior to start of procedure:   Karma CHRIS APRN - NP, have performed the following reviews on Mic Ring prior to the start of the procedure:            * Patient was identified by name and date of birth   * Agreement on procedure being performed was verified  * Risks and Benefits explained to the patient  * Procedure site verified and marked as necessary  * Patient was positioned for comfort  * Consent was signed and verified     Time: 1200      Date of procedure: 10/6/2023    Procedure performed by:  LYNDA Alejandro NP    Provider assisted by: None    Patient assisted by: None    How tolerated by patient: tolerated the procedure well with no complications    Post Procedural Pain Scale: 2 - Hurts Little Bit    Comments: None    KWK:[id number]  EXP: 12/2023          Botox Injection Note       Indication: patient has chronic recurrent migraine, has 7-10 less migraine days per month with botox injections    Procedure:   Botox concentration: 200 units in 4 ml of preservative-free normal saline. 31 sites injections, distribution as follow      Units/site  Sites Sides Subtotal    Procerus 5 1 1 5    5 1 2 10   Frontalis 5 2 2 20   Temporalis 5 4 2 40   Occipitalis 5 3 2 30   Upper cervical paraspinalis 5 2 2 20   Trapezius 5 3 2 30         200 units Botox were reconstituted, 155 units injected as above and the remainder was unavoidably wasted. Patient tolerated procedure well.     _____________________________   Libby Rape OLAUGHLIN       60 mg IM Toradol   4 mg IM Decadron   4 mg IM Zofran   Tolerated in office.

## 2023-12-11 ENCOUNTER — TELEPHONE (OUTPATIENT)
Age: 23
End: 2023-12-11

## 2023-12-11 NOTE — TELEPHONE ENCOUNTER
Please call to discuss the reason for unable to ship the Botox due to patient dis enrolled from [facility] eff 11/30/23

## 2023-12-21 ENCOUNTER — TELEPHONE (OUTPATIENT)
Age: 23
End: 2023-12-21

## 2023-12-28 ENCOUNTER — TELEPHONE (OUTPATIENT)
Age: 23
End: 2023-12-28

## 2023-12-28 NOTE — TELEPHONE ENCOUNTER
PSR called and spoke with the patient who stated she will have the same insurance for her upcoming Botox appointment next year.    Patient would like a call back regarding the status of the prior authorization for her QULIPTA.

## 2023-12-29 ENCOUNTER — TELEPHONE (OUTPATIENT)
Age: 23
End: 2023-12-29

## 2023-12-29 NOTE — TELEPHONE ENCOUNTER
Karlee: I did not have patient's new insurance information and could not get the botox delivered since the old insurance had TERMED. She states she spoke with someone and they told her she was \"good to go on the botox\" I do not have an approval or botox for her appt, can you help? Patient would like a phone call from the prior auth team to explain this to her. She states she called in with her \"new insurance last week, and that should be plenty of time to get the PA and have the botox there\" I explained to her that it does take weeks for this process as we have a lot of botox PA's. Let me know.  Maybe B&B. Appt is 1/5/24. Thanks Carlotta

## 2024-01-02 ENCOUNTER — TELEPHONE (OUTPATIENT)
Age: 24
End: 2024-01-02

## 2024-01-02 NOTE — TELEPHONE ENCOUNTER
Patient is asking for a call back regarding her Botox appointment that's scheduled for this Friday 1/5/24 and a PA for Qulipta. She called 2 weeks ago to update her insurance so her Botox could be mailed. Her insurance is now [employer] and she uploaded it to [employer]. She'd like a call back.

## 2024-01-02 NOTE — TELEPHONE ENCOUNTER
Patient is asking for a call back regarding her Botox appointment that's scheduled for this Friday 1/5/24 and a PA for Qulipta. She called 2 weeks ago to update her insurance so her Botox could be mailed. Her insurance is now [employer] and she uploaded it to [employer]. She'd like a call back.      Note

## 2024-01-03 ENCOUNTER — TELEPHONE (OUTPATIENT)
Age: 24
End: 2024-01-03

## 2024-01-03 NOTE — TELEPHONE ENCOUNTER
Botox B & B PA is currently in the works, please follow notes attached to encounter dated 01/03/24

## 2024-01-03 NOTE — TELEPHONE ENCOUNTER
Updated patient on Qulipta PA. APPROVED. Discussed Botox, explained PA submitted today gave 3-5 day processing time. Explained that PA was submitted as BUY AND BILL so this should expedite the process.  Will keep appt on for FRI and come FRI AM will call to either R/S or inform patient that we will keep it and to come in. Patient verbalized under standing.

## 2024-01-03 NOTE — TELEPHONE ENCOUNTER
Drug Acquisition: Buy and Bill  Drug: Botox 200 units, Dx: G43.711  Insurance: [employer]  Submission Type: Availity  Request Tracking ID: [id number]  Reference #: [id number]  [facility]:   Approval Range: 01/03/24 to 01/02/25    CPT: 77935- No PA Required    Scanned approval letter to media

## 2024-01-03 NOTE — TELEPHONE ENCOUNTER
Qulipta PA submitted via [email or web address]   (Key: [id number]) - [id number]   Approved    Approved today  PA Case: [id number], Status: Approved, Coverage Starts on: 1/3/2024 12:00:00 AM, Coverage Ends on: 1/2/2025 12:00:00 AM.  Authorization Expiration Date: 1/1/2025

## 2024-01-05 ENCOUNTER — PROCEDURE VISIT (OUTPATIENT)
Age: 24
End: 2024-01-05
Payer: COMMERCIAL

## 2024-01-05 DIAGNOSIS — G43.711 CHRONIC MIGRAINE WITHOUT AURA, INTRACTABLE, WITH STATUS MIGRAINOSUS: Primary | ICD-10-CM

## 2024-01-05 PROCEDURE — 64615 CHEMODENERV MUSC MIGRAINE: CPT | Performed by: NURSE PRACTITIONER

## 2024-01-08 ENCOUNTER — TELEPHONE (OUTPATIENT)
Age: 24
End: 2024-01-08

## 2024-01-08 DIAGNOSIS — G43.711 CHRONIC MIGRAINE WITHOUT AURA, INTRACTABLE, WITH STATUS MIGRAINOSUS: Primary | ICD-10-CM

## 2024-01-08 RX ORDER — METHOCARBAMOL 750 MG/1
750 TABLET, FILM COATED ORAL 4 TIMES DAILY
Qty: 40 TABLET | Refills: 0 | Status: SHIPPED | OUTPATIENT
Start: 2024-01-08 | End: 2024-01-18

## 2024-01-08 NOTE — TELEPHONE ENCOUNTER
Patient states she is in a lot of pain after her last appointment from botox on Friday. She states she has been feeling miserable since then. Patient would like a call back to determine what her best options are to help with the pain. Thank you!

## 2024-01-09 NOTE — PROGRESS NOTES
OFFICE PROCEDURE PROGRESS NOTE        Chart reviewed for the following:   Alex CHRIS APRN - NP, have reviewed the History, Physical and updated the Allergic reactions for Trish Ramachandran     TIME OUT performed immediately prior to start of procedure:   Alex CHRIS APRN - NP, have performed the following reviews on Trish Ramachandran prior to the start of the procedure:            * Patient was identified by name and date of birth   * Agreement on procedure being performed was verified  * Risks and Benefits explained to the patient  * Procedure site verified and marked as necessary  * Patient was positioned for comfort  * Consent was signed and verified     Time: 1200      Date of procedure: 1/4/2024    Procedure performed by:  LYNDA Araya NP    Provider assisted by: None    Patient assisted by: None    How tolerated by patient: tolerated the procedure well with no complications    Post Procedural Pain Scale: 2 - Hurts Little Bit    Comments: None      Botox Injection Note       Indication: patient has chronic recurrent migraine, has 7-10 less migraine days per month with botox injections    Procedure:   Botox concentration: 200 units in 4 ml of preservative-free normal saline.   31 sites injections, distribution as follow      Units/site  Sites Sides Subtotal    Procerus 5 1 1 5    5 1 2 10   Frontalis 5 2 2 20   Temporalis 5 4 2 40   Occipitalis 5 3 2 30   Upper cervical paraspinalis 5 2 2 20   Trapezius 5 3 2 30         200 units Botox were reconstituted, 155 units injected as above and the remainder was unavoidably wasted.     Patient tolerated procedure well.     _____________________________   ALEX LINDER

## 2024-01-10 RX ORDER — ATOGEPANT 60 MG/1
1 TABLET ORAL DAILY
Qty: 90 TABLET | Refills: 1 | Status: SHIPPED | OUTPATIENT
Start: 2024-01-10

## 2024-04-12 ENCOUNTER — PROCEDURE VISIT (OUTPATIENT)
Age: 24
End: 2024-04-12

## 2024-04-12 DIAGNOSIS — G43.711 CHRONIC MIGRAINE WITHOUT AURA, INTRACTABLE, WITH STATUS MIGRAINOSUS: Primary | ICD-10-CM

## 2024-04-12 RX ORDER — DEXAMETHASONE SODIUM PHOSPHATE 10 MG/ML
10 INJECTION, SOLUTION INTRAMUSCULAR; INTRAVENOUS ONCE
Status: COMPLETED | OUTPATIENT
Start: 2024-04-12 | End: 2024-04-12

## 2024-04-12 RX ORDER — KETOROLAC TROMETHAMINE 30 MG/ML
60 INJECTION, SOLUTION INTRAMUSCULAR; INTRAVENOUS ONCE
Status: COMPLETED | OUTPATIENT
Start: 2024-04-12 | End: 2024-04-12

## 2024-04-12 RX ADMIN — DEXAMETHASONE SODIUM PHOSPHATE 10 MG: 10 INJECTION, SOLUTION INTRAMUSCULAR; INTRAVENOUS at 11:16

## 2024-04-12 RX ADMIN — KETOROLAC TROMETHAMINE 60 MG: 30 INJECTION, SOLUTION INTRAMUSCULAR; INTRAVENOUS at 11:16

## 2024-04-12 NOTE — PROGRESS NOTES
OFFICE PROCEDURE PROGRESS NOTE        Chart reviewed for the following:   Alex CHRIS APRN - NP, have reviewed the History, Physical and updated the Allergic reactions for Trish Ramachandran     TIME OUT performed immediately prior to start of procedure:   Alex CHRIS APRN - NP, have performed the following reviews on Trish Ramachandran prior to the start of the procedure:            * Patient was identified by name and date of birth   * Agreement on procedure being performed was verified  * Risks and Benefits explained to the patient  * Procedure site verified and marked as necessary  * Patient was positioned for comfort  * Consent was signed and verified     Time: 1000      Date of procedure: 4/12/2024    Procedure performed by:  LYNDA Araya NP    Provider assisted by: None    Patient assisted by: None    How tolerated by patient: tolerated the procedure well with no complications    Post Procedural Pain Scale: 2 - Hurts Little Bit    Comments: None          Botox Injection Note       Indication: patient has chronic recurrent migraine, has 7-10 less migraine days per month with botox injections    Procedure:   Botox concentration: 200 units in 4 ml of preservative-free normal saline.   31 sites injections, distribution as follow      Units/site  Sites Sides Subtotal    Procerus 5 1 1 5    5 1 2 10   Frontalis 5 2 2 20   Temporalis 5 4 2 40   Occipitalis 5 3 2 30   Upper cervical paraspinalis 5 2 2 20   Trapezius 5 3 2 30         200 units Botox were reconstituted, 155 units injected as above and the remainder was unavoidably wasted.     Patient tolerated procedure well.     _____________________________   ALEX OLAUGHLIN     60 MG IM TORADOL   10 MG IM Decadron given both without any side effects.

## 2024-07-12 ENCOUNTER — PROCEDURE VISIT (OUTPATIENT)
Age: 24
End: 2024-07-12

## 2024-07-12 DIAGNOSIS — G43.711 CHRONIC MIGRAINE WITHOUT AURA, INTRACTABLE, WITH STATUS MIGRAINOSUS: Primary | ICD-10-CM

## 2024-07-12 RX ORDER — ATOGEPANT 60 MG/1
1 TABLET ORAL DAILY
Qty: 90 TABLET | Refills: 1 | Status: SHIPPED | OUTPATIENT
Start: 2024-07-12

## 2024-07-12 RX ORDER — DEXAMETHASONE SODIUM PHOSPHATE 10 MG/ML
10 INJECTION, SOLUTION INTRAMUSCULAR; INTRAVENOUS ONCE
Status: COMPLETED | OUTPATIENT
Start: 2024-07-12 | End: 2024-07-12

## 2024-07-12 RX ORDER — KETOROLAC TROMETHAMINE 30 MG/ML
60 INJECTION, SOLUTION INTRAMUSCULAR; INTRAVENOUS ONCE
Status: COMPLETED | OUTPATIENT
Start: 2024-07-12 | End: 2024-07-12

## 2024-07-12 RX ADMIN — KETOROLAC TROMETHAMINE 60 MG: 30 INJECTION, SOLUTION INTRAMUSCULAR; INTRAVENOUS at 10:55

## 2024-07-12 RX ADMIN — DEXAMETHASONE SODIUM PHOSPHATE 10 MG: 10 INJECTION, SOLUTION INTRAMUSCULAR; INTRAVENOUS at 10:52

## 2024-07-12 NOTE — PROGRESS NOTES
OFFICE PROCEDURE PROGRESS NOTE        Chart reviewed for the following:   Alex CHRIS APRN - NP, have reviewed the History, Physical and updated the Allergic reactions for Trish Ramachandran     TIME OUT performed immediately prior to start of procedure:   Alex CHRIS APRN - NP, have performed the following reviews on Trish Ramachandran prior to the start of the procedure:            * Patient was identified by name and date of birth   * Agreement on procedure being performed was verified  * Risks and Benefits explained to the patient  * Procedure site verified and marked as necessary  * Patient was positioned for comfort  * Consent was signed and verified     Time: 1000      Date of procedure: 7/12/2024    Procedure performed by:  LYNDA Araya NP    Provider assisted by: None    Patient assisted by: None    How tolerated by patient: tolerated the procedure well with no complications    Post Procedural Pain Scale: 2 - Hurts Little Bit    Comments: None          Botox Injection Note       Indication: patient has chronic recurrent migraine, has 7-10 less migraine days per month with botox injections    Procedure:   Botox concentration: 200 units in 4 ml of preservative-free normal saline.   31 sites injections, distribution as follow      Units/site  Sites Sides Subtotal    Procerus 5 1 1 5    5 1 2 10   Frontalis 5 2 2 20   Temporalis 5 4 2 40   Occipitalis 5 3 2 30   Upper cervical paraspinalis 5 2 2 20   Trapezius 5 3 2 30         200 units Botox were reconstituted, 155 units injected as above and the remainder was unavoidably wasted.     Patient tolerated procedure well.     _____________________________   ALEX OLAUGHLIN         60 mg IM toradol given   10 mg IM decadron given   No reactions

## 2024-09-27 ENCOUNTER — TELEPHONE (OUTPATIENT)
Age: 24
End: 2024-09-27

## 2024-10-11 ENCOUNTER — OFFICE VISIT (OUTPATIENT)
Age: 24
End: 2024-10-11

## 2024-10-11 DIAGNOSIS — G43.711 CHRONIC MIGRAINE WITHOUT AURA, INTRACTABLE, WITH STATUS MIGRAINOSUS: Primary | ICD-10-CM

## 2024-10-11 RX ORDER — DEXAMETHASONE SODIUM PHOSPHATE 10 MG/ML
10 INJECTION, SOLUTION INTRAMUSCULAR; INTRAVENOUS ONCE
Status: COMPLETED | OUTPATIENT
Start: 2024-10-11 | End: 2024-10-11

## 2024-10-11 RX ORDER — KETOROLAC TROMETHAMINE 30 MG/ML
60 INJECTION, SOLUTION INTRAMUSCULAR; INTRAVENOUS ONCE
Status: COMPLETED | OUTPATIENT
Start: 2024-10-11 | End: 2024-10-11

## 2024-10-11 RX ADMIN — KETOROLAC TROMETHAMINE 60 MG: 30 INJECTION, SOLUTION INTRAMUSCULAR; INTRAVENOUS at 10:21

## 2024-10-11 RX ADMIN — DEXAMETHASONE SODIUM PHOSPHATE 10 MG: 10 INJECTION, SOLUTION INTRAMUSCULAR; INTRAVENOUS at 10:21

## 2024-10-11 NOTE — PROGRESS NOTES
OFFICE PROCEDURE PROGRESS NOTE        Chart reviewed for the following:   Alex CHRIS APRN - NP, have reviewed the History, Physical and updated the Allergic reactions for Trish Ramachandran     TIME OUT performed immediately prior to start of procedure:   Alex CHRIS APRN - NP, have performed the following reviews on Trish Ramachandran prior to the start of the procedure:            * Patient was identified by name and date of birth   * Agreement on procedure being performed was verified  * Risks and Benefits explained to the patient  * Procedure site verified and marked as necessary  * Patient was positioned for comfort  * Consent was signed and verified     Time: 0900      Date of procedure: 10/11/2024    Procedure performed by:  LYNDA Araya NP    Provider assisted by: None    Patient assisted by: None    How tolerated by patient: tolerated the procedure well with no complications    Post Procedural Pain Scale: 2 - Hurts Little Bit    Comments: None    60 mg IM Toradol   10 mg IM Decadron given with no side effects.         Botox Injection Note       Indication: patient has chronic recurrent migraine, has 7-10 less migraine days per month with botox injections    Procedure:   Botox concentration: 200 units in 4 ml of preservative-free normal saline.   31 sites injections, distribution as follow      Units/site  Sites Sides Subtotal    Procerus 5 1 1 5    5 1 2 10   Frontalis 5 2 2 20   Temporalis 5 4 2 40   Occipitalis 5 3 2 30   Upper cervical paraspinalis 5 2 2 20   Trapezius 5 3 2 30         200 units Botox were reconstituted, 155 units injected as above and the remainder was unavoidably wasted.     Patient tolerated procedure well.     _____________________________   ALEX LINDER

## 2024-12-16 ENCOUNTER — TELEPHONE (OUTPATIENT)
Age: 24
End: 2024-12-16

## 2024-12-16 NOTE — TELEPHONE ENCOUNTER
Patient is looking to see if she would be able to get an earlier appt time for Botox on Friday.     Please advise.

## 2024-12-20 ENCOUNTER — OFFICE VISIT (OUTPATIENT)
Age: 24
End: 2024-12-20

## 2024-12-20 DIAGNOSIS — G43.711 CHRONIC MIGRAINE WITHOUT AURA, INTRACTABLE, WITH STATUS MIGRAINOSUS: Primary | ICD-10-CM

## 2024-12-20 RX ORDER — DEXAMETHASONE SODIUM PHOSPHATE 10 MG/ML
10 INJECTION, SOLUTION INTRAMUSCULAR; INTRAVENOUS ONCE
Status: COMPLETED | OUTPATIENT
Start: 2024-12-20 | End: 2024-12-20

## 2024-12-20 RX ORDER — KETOROLAC TROMETHAMINE 30 MG/ML
60 INJECTION, SOLUTION INTRAMUSCULAR; INTRAVENOUS ONCE
Status: COMPLETED | OUTPATIENT
Start: 2024-12-20 | End: 2024-12-20

## 2024-12-20 RX ADMIN — DEXAMETHASONE SODIUM PHOSPHATE 10 MG: 10 INJECTION, SOLUTION INTRAMUSCULAR; INTRAVENOUS at 12:09

## 2024-12-20 RX ADMIN — KETOROLAC TROMETHAMINE 60 MG: 30 INJECTION, SOLUTION INTRAMUSCULAR; INTRAVENOUS at 12:09

## 2024-12-20 NOTE — PROGRESS NOTES
OFFICE PROCEDURE PROGRESS NOTE        Chart reviewed for the following:   Alex CHRIS APRN - NP, have reviewed the History, Physical and updated the Allergic reactions for Trish Ramachandran     TIME OUT performed immediately prior to start of procedure:   Alex CHRIS APRN - NP, have performed the following reviews on Trish Ramachandran prior to the start of the procedure:            * Patient was identified by name and date of birth   * Agreement on procedure being performed was verified  * Risks and Benefits explained to the patient  * Procedure site verified and marked as necessary  * Patient was positioned for comfort  * Consent was signed and verified     Time: 0910      Date of procedure: 12/20/2024    Procedure performed by:  LYNDA Araya NP    Provider assisted by: None    Patient assisted by: None    How tolerated by patient: tolerated the procedure well with no complications    Post Procedural Pain Scale: 2 - Hurts Little Bit    Comments: None    60 MG IM TORADOL   10 MG IM DECADRON   GIVEN IN OFFICE WITH NO SIDE EFFECTS.         Botox Injection Note       Indication: patient has chronic recurrent migraine, has 7-10 less migraine days per month with botox injections    Procedure:   Botox concentration: 200 units in 4 ml of preservative-free normal saline.   31 sites injections, distribution as follow      Units/site  Sites Sides Subtotal    Procerus 5 1 1 5    5 1 2 10   Frontalis 5 2 2 20   Temporalis 5 4 2 40   Occipitalis 5 3 2 30   Upper cervical paraspinalis 5 2 2 20   Trapezius 5 3 2 30         200 units Botox were reconstituted, 155 units injected as above and the remainder was unavoidably wasted.     Patient tolerated procedure well.     _____________________________   ALEX LINDER

## 2025-03-13 ENCOUNTER — TELEPHONE (OUTPATIENT)
Age: 25
End: 2025-03-13

## 2025-03-13 NOTE — TELEPHONE ENCOUNTER
*POLICY NO LONGER SUPPORTS B&B, MUST USE PHARMACY GOING FORWARD*\   REF: [facility] [phone] rep: Jr ONTIVEROS      Botox Drug Acquisition: BUY AND BILL  Drug: BOTOX 200 units  Dx: G43.711  Insurance: [employer]  Submission Type: [facility] [phone] rep: Jr ONTIVEROS  HCPCS:   CPT: 73257, no PA required  Reference #: N/A  Approval Range: MUST USE PHARMACY 2025    Botox Drug Acquisition: PHARMACY  Drug: BOTOX 200 units  Dx: G43.711  Insurance: [employer]  Submission Type: [facility]  Key: [id number]   HCPCS:   CPT: 74701, no PA required  Auth #/Case ID: [id number]   Approval Range: 03/13/25 to 03/13/26  SPP: [facility]     *Scanned approval letter to media

## 2025-03-14 ENCOUNTER — OFFICE VISIT (OUTPATIENT)
Age: 25
End: 2025-03-14

## 2025-03-14 DIAGNOSIS — G43.711 CHRONIC MIGRAINE WITHOUT AURA, INTRACTABLE, WITH STATUS MIGRAINOSUS: Primary | ICD-10-CM

## 2025-03-14 RX ORDER — DEXAMETHASONE SODIUM PHOSPHATE 10 MG/ML
10 INJECTION, SOLUTION INTRAMUSCULAR; INTRAVENOUS ONCE
Status: COMPLETED | OUTPATIENT
Start: 2025-03-14 | End: 2025-03-14

## 2025-03-14 RX ORDER — ATOGEPANT 60 MG/1
1 TABLET ORAL DAILY
Qty: 90 TABLET | Refills: 1 | Status: ACTIVE | OUTPATIENT
Start: 2025-03-14

## 2025-03-14 RX ORDER — KETOROLAC TROMETHAMINE 30 MG/ML
60 INJECTION, SOLUTION INTRAMUSCULAR; INTRAVENOUS ONCE
Status: COMPLETED | OUTPATIENT
Start: 2025-03-14 | End: 2025-03-14

## 2025-03-14 RX ADMIN — DEXAMETHASONE SODIUM PHOSPHATE 10 MG: 10 INJECTION, SOLUTION INTRAMUSCULAR; INTRAVENOUS at 13:31

## 2025-03-14 RX ADMIN — KETOROLAC TROMETHAMINE 60 MG: 30 INJECTION, SOLUTION INTRAMUSCULAR; INTRAVENOUS at 13:32

## 2025-03-14 NOTE — PROGRESS NOTES
OFFICE PROCEDURE PROGRESS NOTE        Chart reviewed for the following:   Alex CHRIS APRN - NP, have reviewed the History, Physical and updated the Allergic reactions for Trish Ramachandran     TIME OUT performed immediately prior to start of procedure:   Alex CHRIS APRN - NP, have performed the following reviews on Trish Ramachandran prior to the start of the procedure:            * Patient was identified by name and date of birth   * Agreement on procedure being performed was verified  * Risks and Benefits explained to the patient  * Procedure site verified and marked as necessary  * Patient was positioned for comfort  * Consent was signed and verified     Time: 0900      Date of procedure: 3/14/2025    Procedure performed by:  LYNDA Araya NP    Provider assisted by: None    Patient assisted by: None    How tolerated by patient: tolerated the procedure well with no complications    Post Procedural Pain Scale: 2 - Hurts Little Bit    Comments: None    60 mg IM toradol   10 mg IM Decadron   No side effects     Patient supplied botox   Sample.       Botox Injection Note       Indication: patient has chronic recurrent migraine, has 7-10 less migraine days per month with botox injections    Procedure:   Botox concentration: 200 units in 4 ml of preservative-free normal saline.   31 sites injections, distribution as follow      Units/site  Sites Sides Subtotal    Procerus 5 1 1 5    5 1 2 10   Frontalis 5 2 2 20   Temporalis 5 4 2 40   Occipitalis 5 3 2 30   Upper cervical paraspinalis 5 2 2 20   Trapezius 5 3 2 30         200 units Botox were reconstituted, 155 units injected as above and the remainder was unavoidably wasted.     Patient tolerated procedure well.     _____________________________   ALEX LINDER         Needs a new re-PA for Qulipta.   Will give samples and send in to trigger

## 2025-04-15 ENCOUNTER — TELEMEDICINE (OUTPATIENT)
Age: 25
End: 2025-04-15
Payer: COMMERCIAL

## 2025-04-15 DIAGNOSIS — G43.711 CHRONIC MIGRAINE WITHOUT AURA, INTRACTABLE, WITH STATUS MIGRAINOSUS: Primary | ICD-10-CM

## 2025-04-15 DIAGNOSIS — E23.7 PITUITARY LESION: ICD-10-CM

## 2025-04-15 PROCEDURE — 99214 OFFICE O/P EST MOD 30 MIN: CPT | Performed by: NURSE PRACTITIONER

## 2025-04-15 NOTE — PROGRESS NOTES
4/15/2025    TELEHEALTH EVALUATION -- Audio/Visual    HPI:    Trish Ramachandran (:  2000) has requested an audio/video evaluation for the following concern(s):      FU chronic migraine   Has been getting Botox for chronic migraine   Has been dropped from 25 a month to around 5-6 a month   She takes the Ubrelvy if needed.     They have not changed at all.   Unilateral, sharp  Hypersensitive to light, sound, smell.     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 Authorizing Provider   Atogepant (QULIPTA) 60 MG TABS Take 1 tablet by mouth daily  Mathew Barry APRN - NP   Onabotulinumtoxin A (BOTOX) 200 units injection Inject 155 units into 31 FDA approved sites in head, face, neck, every 3 months for chronic migraine.  Mathew Barry APRN - NP   diclofenac (VOLTAREN) 75 MG EC tablet Take 1 tablet by mouth 2 times daily  Mathew Barry APRN - NP   buPROPion (WELLBUTRIN XL) 150 MG extended release tablet Take 1 tablet by mouth daily  Automatic Reconciliation, Ar   norethindrone-ethinyl estradiol (MICROGESTIN /20) 1-20 MG-MCG per tablet Take 1 tablet by mouth daily  Automatic Reconciliation, Ar   prochlorperazine (COMPAZINE) 10 MG tablet 1 tablet by mouth every 8 hours PRN  Automatic Reconciliation, Ar   Ubrogepant 100 MG TABS 1 at HA onset and repeat in 2 hours if needed.  Max 2 in 24 hours  Automatic Reconciliation, Ar       Social History     Tobacco Use    Smoking status: Never   Substance Use Topics    Alcohol use: Yes    Drug use: Never        Allergies   Allergen Reactions    Erythromycin Nausea And Vomiting    Azithromycin Other (See Comments)   ,   Past Medical History:   Diagnosis Date    Migraine    , No

## 2025-05-28 ENCOUNTER — TELEPHONE (OUTPATIENT)
Age: 25
End: 2025-05-28

## 2025-05-28 DIAGNOSIS — G43.711 CHRONIC MIGRAINE WITHOUT AURA, INTRACTABLE, WITH STATUS MIGRAINOSUS: Primary | ICD-10-CM

## 2025-05-28 NOTE — TELEPHONE ENCOUNTER
Called [facility] for botox delivery, THEY WILL NEED TO SET UP ACCOUNT FOR PATIENT  RX SENT TO THEM, SENT PATIENT MESSAGE

## 2025-05-29 DIAGNOSIS — G43.711 CHRONIC MIGRAINE WITHOUT AURA, INTRACTABLE, WITH STATUS MIGRAINOSUS: ICD-10-CM

## 2025-06-02 ENCOUNTER — TELEPHONE (OUTPATIENT)
Age: 25
End: 2025-06-02

## 2025-06-04 ENCOUNTER — TELEPHONE (OUTPATIENT)
Age: 25
End: 2025-06-04

## 2025-06-13 ENCOUNTER — OFFICE VISIT (OUTPATIENT)
Age: 25
End: 2025-06-13

## 2025-06-13 DIAGNOSIS — G43.711 CHRONIC MIGRAINE WITHOUT AURA, INTRACTABLE, WITH STATUS MIGRAINOSUS: Primary | ICD-10-CM

## 2025-06-16 RX ORDER — METHYLPREDNISOLONE SODIUM SUCCINATE 40 MG/ML
40 INJECTION INTRAMUSCULAR; INTRAVENOUS ONCE
Status: COMPLETED | OUTPATIENT
Start: 2025-06-16 | End: 2025-06-16

## 2025-06-16 RX ORDER — KETOROLAC TROMETHAMINE 30 MG/ML
60 INJECTION, SOLUTION INTRAMUSCULAR; INTRAVENOUS ONCE
Status: COMPLETED | OUTPATIENT
Start: 2025-06-16 | End: 2025-06-16

## 2025-06-16 RX ADMIN — KETOROLAC TROMETHAMINE 60 MG: 30 INJECTION, SOLUTION INTRAMUSCULAR; INTRAVENOUS at 09:26

## 2025-06-16 RX ADMIN — METHYLPREDNISOLONE SODIUM SUCCINATE 40 MG: 40 INJECTION INTRAMUSCULAR; INTRAVENOUS at 09:26

## 2025-06-16 NOTE — PROGRESS NOTES
OFFICE PROCEDURE PROGRESS NOTE      Chief Complaint   Patient presents with    Botox Injection     Patient states they are having    4 migraines per month, and patient has   75 % in reduction of migraines since the start of botox.               Chart reviewed for the following:   Alex CHRIS DNP, have reviewed the History, Physical and updated the Allergic reactions for Trish Ramachandran     TIME OUT performed immediately prior to start of procedure:   Alex CHRIS DNP, have performed the following reviews on Trish Ramachandran prior to the start of the procedure:            * Patient was identified by name and date of birth   * Agreement on procedure being performed was verified  * Risks and Benefits explained to the patient  * Procedure site verified and marked as necessary  * Patient was positioned for comfort  * Consent was signed and verified     Time: 1100      Date of procedure: 6/13/2025    Procedure performed by:  Alex Barry DNP    Provider assisted by: None    Patient assisted by: None    How tolerated by patient: tolerated the procedure well with no complications    Post Procedural Pain Scale: 2 - Hurts Little Bit    Comments: None    60 mg IM toradol   40 mg IM solumedrol  Given in office with no side effects.         Botox Injection Note       Indication: patient has chronic recurrent migraine, has 7-10 less migraine days per month with botox injections    Procedure:   Botox concentration: 200 units in 4 ml of preservative-free normal saline.   31 sites injections, distribution as follow      Units/site  Sites Sides Subtotal    Procerus 5 1 1 5    5 1 2 10   Frontalis 5 2 2 20   Temporalis 5 4 2 40   Occipitalis 5 3 2 30   Upper cervical paraspinalis 5 2 2 20   Trapezius 5 3 2 30         200 units Botox were reconstituted, 155 units injected as above and the remainder was unavoidably wasted.     Patient tolerated procedure well.     _____________________________   ALEX

## 2025-08-26 ENCOUNTER — TELEPHONE (OUTPATIENT)
Age: 25
End: 2025-08-26

## 2025-08-28 ENCOUNTER — TELEPHONE (OUTPATIENT)
Age: 25
End: 2025-08-28